# Patient Record
Sex: FEMALE | Race: WHITE | HISPANIC OR LATINO | URBAN - METROPOLITAN AREA
[De-identification: names, ages, dates, MRNs, and addresses within clinical notes are randomized per-mention and may not be internally consistent; named-entity substitution may affect disease eponyms.]

---

## 2023-07-04 ENCOUNTER — EMERGENCY (EMERGENCY)
Facility: HOSPITAL | Age: 38
LOS: 1 days | Discharge: HOME | End: 2023-07-04
Admitting: EMERGENCY MEDICINE
Payer: COMMERCIAL

## 2023-07-04 VITALS
TEMPERATURE: 98 F | DIASTOLIC BLOOD PRESSURE: 91 MMHG | HEART RATE: 67 BPM | SYSTOLIC BLOOD PRESSURE: 153 MMHG | OXYGEN SATURATION: 100 % | RESPIRATION RATE: 18 BRPM

## 2023-07-04 PROCEDURE — 99284 EMERGENCY DEPT VISIT MOD MDM: CPT

## 2023-07-04 RX ORDER — ACETAMINOPHEN 500 MG
975 TABLET ORAL ONCE
Refills: 0 | Status: COMPLETED | OUTPATIENT
Start: 2023-07-04 | End: 2023-07-04

## 2023-07-04 RX ORDER — TETANUS TOXOID, REDUCED DIPHTHERIA TOXOID AND ACELLULAR PERTUSSIS VACCINE, ADSORBED 5; 2.5; 8; 8; 2.5 [IU]/.5ML; [IU]/.5ML; UG/.5ML; UG/.5ML; UG/.5ML
0.5 SUSPENSION INTRAMUSCULAR ONCE
Refills: 0 | Status: COMPLETED | OUTPATIENT
Start: 2023-07-04 | End: 2023-07-04

## 2023-07-04 RX ORDER — IBUPROFEN 200 MG
600 TABLET ORAL ONCE
Refills: 0 | Status: DISCONTINUED | OUTPATIENT
Start: 2023-07-04 | End: 2023-07-04

## 2023-07-04 RX ADMIN — Medication 975 MILLIGRAM(S): at 23:47

## 2023-07-04 RX ADMIN — TETANUS TOXOID, REDUCED DIPHTHERIA TOXOID AND ACELLULAR PERTUSSIS VACCINE, ADSORBED 0.5 MILLILITER(S): 5; 2.5; 8; 8; 2.5 SUSPENSION INTRAMUSCULAR at 23:47

## 2023-07-04 NOTE — ED PROVIDER NOTE - NSFOLLOWUPINSTRUCTIONS_ED_ALL_ED_FT
Please follow-up with  [facility] Department of Ophthalmology within 1 week of after discharge at:    [address]  [place] [address]  [phone]    Corneal Abrasion    A corneal abrasion is a scratch or injury to the clear covering over the front of your eye (cornea). This can be painful. It is important to get treatment for a corneal abrasion. If this problem is not treated, it can affect your eyesight (vision).    What are the causes?  A poke in the eye.  An object in the eye.  Too much eye rubbing.  Very dry eyes.  Certain eye infections.  Contact lenses that do not fit right or are worn for too long. You can also injure your cornea when putting contact lenses in your eye or taking them out.  Eye surgery.  Certain cornea problems may increase the chance of a corneal abrasion.  Sometimes, the cause is not known.    What are the signs or symptoms?  Eye pain. The pain may get worse when you open and close your eye or when you move your eye.  A feeling of something stuck in your eye.  Tearing, redness, and sensitivity to light.  Having trouble keeping your eye open, or not being able to keep it open.  Blurred vision.  Headache.  How is this diagnosed?  You may work with a health care provider who specializes in conditions of the eye (ophthalmologist). This condition may be diagnosed based on your medical history, symptoms, and an eye exam.    How is this treated?  Washing out your eye.  Removing anything that is stuck in your eye.  Using antibiotic drops or ointment to treat or prevent an infection.  Using a dilating drop to decrease irritation, swelling, and pain.  Using steroid drops or ointment to treat redness, irritation, or swelling.  Applying a cold, wet cloth (cold compress) or ice pack to ease the pain  Taking pain medicine by mouth.  In some cases, an eye patch or bandage soft contact lens might also be used. An eye patch should not be used if the corneal abrasion was related to contact lens wear as it can increase the chance of infection in these eyes.    Follow these instructions at home:  Medicines    Use eye drops or ointments as told by your doctor.  If you were prescribed antibiotic drops or ointment, use them as told by your doctor. Do not stop using the antibiotic even if you start to feel better.  Take over-the-counter and prescription medicines only as told by your doctor.  Ask your doctor if the medicine prescribed to you:  Requires you to avoid driving or using heavy machinery.  Can cause trouble pooping (constipation). You may need to take these actions to prevent or treat trouble pooping:  Drink enough fluid to keep your pee (urine) pale yellow.  Take over-the-counter or prescription medicines.  Eat foods that are high in fiber. These include beans, whole grains, and fresh fruits and vegetables.  Limit foods that are high in fat and processed sugars. These include fried or sweet foods.  Using an eye patch    If you have an eye patch, wear it as told by your doctor.  Do not drive or use machinery while wearing an eye patch.  Follow instructions from your doctor about when to take off the patch.  General instructions    Ask your doctor if you can use a cold, wet cloth on your eye to help with pain.  Do not rub or touch your eye. Do not wash out your eye.  Do not wear contact lenses until your doctor says that this is okay.  Avoid bright light.  Avoid straining your eyes.  Keep all follow-up visits as told by your doctor. Doing this can help to prevent infection and loss of eyesight.  Contact a doctor if:  You keep having eye pain and other symptoms for more than 2 days.  You get new symptoms, such as more redness, watery eyes, or discharge.  You have discharge that makes your eyelids stick together in the morning.  Your eye patch becomes so loose that you can blink your eye.  Symptoms come back after your eye heals.  Get help right away if:  You have very bad eye pain that does not get better with medicine.  You lose eyesight.  Summary  A corneal abrasion is a scratch or injury to the clear covering over the front of the eye (cornea).  It is important to get treatment for a corneal abrasion. If this problem is not treated, it can affect your eyesight (vision).  Use eye drops or ointments as told by your doctor.  If you have an eye patch, do not drive or use machinery while wearing it.  Let your doctor know if your symptoms last for more than 2 days.
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
## 2023-07-04 NOTE — ED PROVIDER NOTE - PATIENT PORTAL LINK FT
You can access the FollowMyHealth Patient Portal offered by [facility] by registering at the following website: [email or web address]. By joining [employer]’s FollowMyHealth portal, you will also be able to view your health information using other applications (apps) compatible with our system. You can access the FollowMyHealth Patient Portal offered by [facility] by registering at the following website: [email or web address]. By joining [employer]’s FollowMyHealth portal, you will also be able to view your health information using other applications (apps) compatible with our system.

## 2023-07-04 NOTE — ED PROVIDER NOTE - PHYSICAL EXAMINATION
CONSTITUTIONAL: Well-appearing; well-nourished; in no apparent distress. Non-toxic appearing.   NEURO: Alert & oriented. Gait steady without assistance. Sensory and motor functions are grossly intact.  PSYCH: Mood appropriate. Thought processes intact.   EYES: PERRL on the right, left pupil is dilated and fixed, does not react to light. EOMs intact no nystagmus or entrapment.  (+) mild upper and lower lid edema on the left with superfical subcentimeter abrasion inferior to lower lid on the left.  No crusting or proptosis. (+) increased tearing on the L.  Sclera white, conjunctiva pink on the R. Sclera and conjunctiva are injected with a focus of hyperemia to lateral aspect of L eye. No obvious FBs noted to the cornea. Fluorescein stain reveals large area of uptake from 2 o'clock position to 7 o'clock position over the cornea. Orbits non-tender b/l.   Visual acuity 20/200 on the L, 20/13 on the R.   MUSCULOSKELETAL/EXTREMITIES: FROM in all four extremities.  SKIN: As noted above. CONSTITUTIONAL: Well-appearing; well-nourished; in no apparent distress. Non-toxic appearing.   NEURO: Alert & oriented. Gait steady without assistance. Sensory and motor functions are grossly intact.  PSYCH: Mood appropriate. Thought processes intact.   EYES: PERRL on the right, left pupil is dilated and fixed, does not react to light. EOMs intact no nystagmus or entrapment.  (+) mild upper and lower lid edema on the left with superficial subcentimeter abrasion inferior to lower lid on the left.  No crusting or proptosis. (+) increased tearing on the L.  Sclera white, conjunctiva pink on the R. Sclera and conjunctiva are injected with a focus of hyperemia to lateral aspect of L eye. No obvious FBs noted to the cornea. Fluorescein stain reveals large area of uptake from 2 o'clock position to 7 o'clock position over the cornea. Orbits non-tender b/l.   Visual acuity 20/200 on the L, 20/13 on the R.   MUSCULOSKELETAL/EXTREMITIES: FROM in all four extremities.  SKIN: As noted above.
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
## 2023-07-05 ENCOUNTER — NON-APPOINTMENT (OUTPATIENT)
Age: 38
End: 2023-07-05

## 2023-07-05 ENCOUNTER — APPOINTMENT (OUTPATIENT)
Dept: OPHTHALMOLOGY | Facility: CLINIC | Age: 38
End: 2023-07-05
Payer: COMMERCIAL

## 2023-07-05 PROCEDURE — 92004 COMPRE OPH EXAM NEW PT 1/>: CPT

## 2023-07-05 RX ORDER — CYCLOPENTOLATE HYDROCHLORIDE 10 MG/ML
1 SOLUTION/ DROPS OPHTHALMIC
Qty: 1 | Refills: 0
Start: 2023-07-05 | End: 2023-07-11

## 2023-07-05 RX ORDER — MOXIFLOXACIN HCL 0.5 %
1 DROPS OPHTHALMIC (EYE)
Refills: 0 | Status: DISCONTINUED | OUTPATIENT
Start: 2023-07-05 | End: 2023-07-08

## 2023-07-05 RX ORDER — OFLOXACIN 0.3 %
1 DROPS OPHTHALMIC (EYE)
Qty: 1 | Refills: 0
Start: 2023-07-05 | End: 2023-07-11

## 2023-07-05 RX ORDER — CYCLOPENTOLATE HYDROCHLORIDE 10 MG/ML
1 SOLUTION/ DROPS OPHTHALMIC
Refills: 0 | Status: DISCONTINUED | OUTPATIENT
Start: 2023-07-05 | End: 2023-07-08

## 2023-07-05 RX ORDER — ERYTHROMYCIN BASE 5 MG/GRAM
1 OINTMENT (GRAM) OPHTHALMIC (EYE)
Qty: 1 | Refills: 0
Start: 2023-07-05 | End: 2023-07-11

## 2023-07-05 RX ORDER — ERYTHROMYCIN BASE 5 MG/GRAM
1 OINTMENT (GRAM) OPHTHALMIC (EYE) AT BEDTIME
Refills: 0 | Status: DISCONTINUED | OUTPATIENT
Start: 2023-07-05 | End: 2023-07-08

## 2023-07-05 RX ORDER — OFLOXACIN 0.3 %
1 DROPS OPHTHALMIC (EYE)
Refills: 0 | Status: DISCONTINUED | OUTPATIENT
Start: 2023-07-05 | End: 2023-07-05
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
## 2023-07-06 ENCOUNTER — APPOINTMENT (OUTPATIENT)
Dept: OPHTHALMOLOGY | Facility: CLINIC | Age: 38
End: 2023-07-06
Payer: COMMERCIAL

## 2023-07-06 ENCOUNTER — NON-APPOINTMENT (OUTPATIENT)
Age: 38
End: 2023-07-06

## 2023-07-06 PROBLEM — Z78.9 OTHER SPECIFIED HEALTH STATUS: Chronic | Status: ACTIVE | Noted: 2023-07-04

## 2023-07-06 PROCEDURE — 92012 INTRM OPH EXAM EST PATIENT: CPT

## 2023-07-07 ENCOUNTER — APPOINTMENT (OUTPATIENT)
Dept: OPHTHALMOLOGY | Facility: CLINIC | Age: 38
End: 2023-07-07
Payer: COMMERCIAL

## 2023-07-07 ENCOUNTER — NON-APPOINTMENT (OUTPATIENT)
Age: 38
End: 2023-07-07

## 2023-07-07 PROCEDURE — 92012 INTRM OPH EXAM EST PATIENT: CPT

## 2023-07-10 ENCOUNTER — APPOINTMENT (OUTPATIENT)
Dept: OPHTHALMOLOGY | Facility: CLINIC | Age: 38
End: 2023-07-10
Payer: COMMERCIAL

## 2023-07-10 ENCOUNTER — TRANSCRIPTION ENCOUNTER (OUTPATIENT)
Age: 38
End: 2023-07-10

## 2023-07-10 ENCOUNTER — NON-APPOINTMENT (OUTPATIENT)
Age: 38
End: 2023-07-10

## 2023-07-10 PROCEDURE — 92012 INTRM OPH EXAM EST PATIENT: CPT

## 2023-07-14 ENCOUNTER — NON-APPOINTMENT (OUTPATIENT)
Age: 38
End: 2023-07-14

## 2023-07-14 ENCOUNTER — APPOINTMENT (OUTPATIENT)
Dept: OPHTHALMOLOGY | Facility: CLINIC | Age: 38
End: 2023-07-14
Payer: COMMERCIAL

## 2023-07-14 PROCEDURE — 92012 INTRM OPH EXAM EST PATIENT: CPT

## 2023-07-21 ENCOUNTER — APPOINTMENT (OUTPATIENT)
Dept: OPHTHALMOLOGY | Facility: CLINIC | Age: 38
End: 2023-07-21